# Patient Record
Sex: FEMALE | Race: AMERICAN INDIAN OR ALASKA NATIVE
[De-identification: names, ages, dates, MRNs, and addresses within clinical notes are randomized per-mention and may not be internally consistent; named-entity substitution may affect disease eponyms.]

---

## 2019-02-19 ENCOUNTER — HOSPITAL ENCOUNTER (OUTPATIENT)
Dept: HOSPITAL 5 - MAMMO | Age: 64
Discharge: HOME | End: 2019-02-19
Attending: INTERNAL MEDICINE
Payer: MEDICARE

## 2019-02-19 DIAGNOSIS — Z12.31: Primary | ICD-10-CM

## 2019-02-19 PROCEDURE — 77067 SCR MAMMO BI INCL CAD: CPT

## 2019-02-19 NOTE — MAMMOGRAPHY REPORT
BILATERAL DIGITAL SCREENING MAMMOGRAM with CAD: 02/19/19



CLINICAL: Routine screening.



COMPARISON:None available.  However, a prior mammogram was apparently 

done at [facility].



FINDINGS: The breasts are mostly fatty.A few scattered bilateral 

calcifications with benign morphology.2 groups of left inner 

calcifications require comparison with the prior mammogram or 

additional imaging.  No mass or architectural distortion.  The right 

breast is negative.



IMPRESSION: Left calcifications requiring further evaluation.



BI-RADS CATEGORY:  0 -- Additional Evaluation Required



RECOMMENDATION: Comparison with a previous mammogram.



We will attempt to obtain a prior mammogram for comparison.  If we do 

not obtain a prior mammogram  within 30 days, a revised report will be 

issued recommending a recall for additional imaging. Please be advised 

that the patient should not schedule an appointment for return until 

adequate time (at least 2 weeks) has passed for us to obtain the prior 

mammogram.



ACR BI-RADS MAMMOGRAPHIC CODES:

0 = Needs additional imaging evaluation; 1 = Negative; 2 = Benign; 3 = 

Probably benign; 4 = Suspicious; 5 = Malignant; 6 = Known biopsy-proven 

malignancy



COMMENT:

      1.   Dense breast tissue, i.e., adenosis, fibrocystic 

            changes, etc., may obscure an underlying neoplasm.

      2.   Approximately 10% of cancers are not detected with

            mammography.

      3.   A negative mammography report should not delay biopsy 

            if a clinically suspicious mass is present.



COMMENT:

Patient follow-up letters are generated via our [employer] application.

## 2019-04-12 ENCOUNTER — HOSPITAL ENCOUNTER (OUTPATIENT)
Dept: HOSPITAL 5 - US | Age: 64
Discharge: HOME | End: 2019-04-12
Attending: INTERNAL MEDICINE
Payer: MEDICARE

## 2019-04-12 DIAGNOSIS — R92.8: Primary | ICD-10-CM

## 2019-04-12 NOTE — MAMMOGRAPHY REPORT
LEFT DIGITAL DIAGNOSTIC MAMMOGRAM : 04/12/19 09:59:00



CLINICAL: Recall to evaluate calcifications.



COMPARISON:02/19/19, 02/19/18 and 12/15/16



FINDINGS: ML, MLO and CC magnification views demonstrate 2 groups of 

calcifications which display an increased number compared to prior 

mammograms.The group at 11 o'clock located approximately 20 cm from the 

nipple is irregular forms with approximately 9 calcifications.  The 

more medial group of calcifications has more rounded forms with at 

least 10 calcifications.  This group could not be imaged on a lateral 

view.  No associated mass or architectural distortion.  







IMPRESSION: 2 groups of probably benign calcifications.



BI-RADS CATEGORY:  3 -- Probably Benign



RECOMMENDATION: 6 month follow-up right diagnostic mammogram with 

magnification views of calcifications.



ACR BI-RADS MAMMOGRAPHIC CODES:

0 = Needs additional imaging evaluation; 1 = Negative; 2 = Benign; 3 = 

Probably benign; 4 = Suspicious; 5 = Malignant; 6 = Known biopsy-proven 

malignancy



COMMENT:

      1.   Dense breast tissue, i.e., adenosis, fibrocystic 

            changes, etc., may obscure an underlying neoplasm.

      2.   Approximately 10% of cancers are not detected with

            mammography.

      3.   A negative mammography report should not delay biopsy 

            if a clinically suspicious mass is present.





COMMENT:

Patient follow-up letters are generated by our [employer] application.

## 2020-01-04 ENCOUNTER — HOSPITAL ENCOUNTER (EMERGENCY)
Dept: HOSPITAL 5 - ED | Age: 65
Discharge: HOME | End: 2020-01-04
Payer: MEDICARE

## 2020-01-04 VITALS — DIASTOLIC BLOOD PRESSURE: 76 MMHG | SYSTOLIC BLOOD PRESSURE: 142 MMHG

## 2020-01-04 DIAGNOSIS — J11.1: Primary | ICD-10-CM

## 2020-01-04 DIAGNOSIS — I10: ICD-10-CM

## 2020-01-04 DIAGNOSIS — Z79.899: ICD-10-CM

## 2020-01-04 NOTE — EMERGENCY DEPARTMENT REPORT
- General


Chief Complaint: Upper Respiratory Infection


Stated Complaint: WEAK,LEG PAIN,BODY ACHE


Time Seen by Provider: 01/04/20 13:30


Source: patient


Mode of arrival: Ambulatory


Limitations: No Limitations





- History of Present Illness


Initial Comments: 





Patient is a 64-year-old female presents emergency room with complaints of 

flulike symptoms that began 2 days ago.  She has associated headache, body 

aches, dry cough, vomiting.  She denies any rhinorrhea, fever, diarrhea, 

shortness of breath, chest pain.  She states that she has had multiple sick 

contacts with similar symptoms.  She states she has a past medical history of 

hypertension and did not take her medication today.  She denies any allergies 

medications.





- Related Data


                                  Previous Rx's











 Medication  Instructions  Recorded  Last Taken  Type


 


Oseltamivir [Tamiflu] 75 mg PO BID 5 Days #10 cap 01/04/20 Unknown Rx











                                    Allergies











Allergy/AdvReac Type Severity Reaction Status Date / Time


 


No Known Allergies Allergy   Unverified 02/19/19 08:56














ED Review of Systems


ROS: 


Stated complaint: WEAK,LEG PAIN,BODY ACHE


Other details as noted in HPI





Comment: All other systems reviewed and negative





ED Past Medical Hx





- Past Medical History


Previous Medical History?: Yes


Hx Hypertension: Yes





- Surgical History


Past Surgical History?: No





- Social History


Smoking Status: Never Smoker


Substance Use Type: None





- Medications


Home Medications: 


                                Home Medications











 Medication  Instructions  Recorded  Confirmed  Last Taken  Type


 


Oseltamivir [Tamiflu] 75 mg PO BID 5 Days #10 cap 01/04/20  Unknown Rx














ED Physical Exam





- General


Limitations: No Limitations


General appearance: alert, in no apparent distress





- Head


Head exam: Present: atraumatic, normocephalic





- Eye


Eye exam: Present: normal appearance





- ENT


ENT exam: Present: normal orophraynx, mucous membranes moist, TM's normal 

bilaterally, normal external ear exam





- Respiratory


Respiratory exam: Present: normal lung sounds bilaterally.  Absent: respiratory 

distress, wheezes, rales, rhonchi, stridor, chest wall tenderness, accessory 

muscle use, decreased breath sounds, prolonged expiratory





- Cardiovascular


Cardiovascular Exam: Present: regular rate, normal rhythm, normal heart sounds. 

 Absent: systolic murmur, diastolic murmur, rubs, gallop





- Neurological Exam


Neurological exam: Present: alert, oriented X3





- Psychiatric


Psychiatric exam: Present: normal affect, normal mood





- Skin


Skin exam: Present: warm, dry, intact





ED Course


                                   Vital Signs











  01/04/20 01/04/20 01/04/20





  10:47 14:04 14:27


 


Temperature 99.7 F H 98.7 F 


 


Pulse Rate 74 60 


 


Respiratory 18 20 18





Rate   


 


Blood Pressure 149/128  


 


Blood Pressure  142/76 





[Right]   


 


O2 Sat by Pulse 99 99 





Oximetry   














ED Medical Decision Making





- Medical Decision Making





Patient is a 64-year-old female presents emergency room with complaints of 

flulike symptoms that began 2 days ago.  She has associated headache, body ach

es, dry cough, vomiting.  She denies any rhinorrhea, fever, diarrhea, shortness 

of breath, chest pain.  She states that she has had multiple sick contacts with 

similar symptoms.  She states she has a past medical history of hypertension and

 did not take her medication today.  She denies any allergies medications. 

initial vitals with low grade temp and elevated BP which improved upon repeat. 

pt given ibuprofen and tylenol for her headache which improved.  lungs are clear

 bilaterally, no clinical s/sx of PNA. Symptoms and examination consistent with 

influenza.  Patient is within the 48-hour range for Tamiflu.  Advised patient 

that the medication would shorten symptoms for approximately 1 day and she 

elected to continue with Tamiflu therapy. advised pt please take medication as 

prescribed.  Increase your fluid intake over the next several days.  May 

alternate Tylenol and ibuprofen every 4 hours as needed for fever or body aches.

  May take over-the-counter cough and cold medication or TheraFlu.  Please 

follow-up with your primary care doctor in the next 2-3 days for reexamination. 

 Return to the emergency room for any new or worsening symptoms.  Please take 

your blood pressure medication as prescribed by your primary care physician.  

Keep a blood pressure log and take your blood pressure 3 times a day.  Eat a low

 sodium diet.  Increase your water intake.  Incorporate daily exercise.





- Differential Diagnosis


influenza, URI, PNA, bronchitis, viral syndrome, otitis, pharyngitis 


Critical care attestation.: 


If time is entered above; I have spent that time in minutes in the direct care 

of this critically ill patient, excluding procedure time.








ED Disposition


Clinical Impression: 


 Influenza





Disposition: DC-01 TO HOME OR SELFCARE


Is pt being admited?: No


Does the pt Need Aspirin: No


Condition: Stable


Instructions:  Influenza (ED)


Additional Instructions: 


please take medication as prescribed.  Increase your fluid intake over the next 

several days.  May alternate Tylenol and ibuprofen every 4 hours as needed for 

fever or body aches.  May take over-the-counter cough and cold medication or 

TheraFlu.  Please follow-up with your primary care doctor in the next 2-3 days 

for reexamination.  Return to the emergency room for any new or worsening 

symptoms.  Please take your blood pressure medication as prescribed by your 

primary care physician.  Keep a blood pressure log and take your blood pressure 

3 times a day.  Eat a low sodium diet.  Increase your water intake.  Incorporate

 daily exercise.


Prescriptions: 


Oseltamivir [Tamiflu] 75 mg PO BID 5 Days #10 cap


Referrals: 


PRIMARY CARE,MD [Primary Care Provider] - 2-3 Days


Time of Disposition: 14:15


Print Language: ENGLISH